# Patient Record
Sex: FEMALE | Race: BLACK OR AFRICAN AMERICAN | Employment: OTHER | URBAN - METROPOLITAN AREA
[De-identification: names, ages, dates, MRNs, and addresses within clinical notes are randomized per-mention and may not be internally consistent; named-entity substitution may affect disease eponyms.]

---

## 2017-01-03 ENCOUNTER — HOSPITAL ENCOUNTER (OUTPATIENT)
Dept: PHYSICAL THERAPY | Age: 57
Discharge: HOME | End: 2017-01-03
Payer: OTHER GOVERNMENT

## 2017-01-03 PROCEDURE — 97530 THERAPEUTIC ACTIVITIES: CPT

## 2017-01-03 PROCEDURE — 97110 THERAPEUTIC EXERCISES: CPT

## 2017-01-03 NOTE — PROGRESS NOTES
PT DAILY TREATMENT NOTE 12    Patient Name: Erick Jensen  Date:1/3/2017  : 1960  [x]  Patient  Verified  Payor:  / Plan: [facility]  RETIREES AND DEPENDENTS / Product Type: Surendra aLcy /    In time:440  Out time:531  Total Treatment Time (min): 51  Visit #: 2 of 12    Treatment Area: Low back pain [M54.5]    SUBJECTIVE  Pain Level (0-10 scale): 2  Any medication changes, allergies to medications, adverse drug reactions, diagnosis change, or new procedure performed?: [x] No    [] Yes (see summary sheet for update)  Subjective functional status/changes:   [] No changes reported  \"It is feeling pretty good. Better than last time. \"    OBJECTIVE    Modality rationale: decrease pain and increase tissue extensibility to improve the patients ability to perform ADL's with reduced pain levels.     Min Type Additional Details    [] Estim:  []Unatt       []IFC  []Premod                        []Other:  []w/ice   []w/heat  Position:  Location:    [] Estim: []Att    []TENS instruct  []NMES                    []Other:  []w/US   []w/ice   []w/heat  Position:  Location:    []  Traction: [] Cervical       []Lumbar                       [] Prone          []Supine                       []Intermittent   []Continuous Lbs:  [] before manual  [] after manual    []  Ultrasound: []Continuous   [] Pulsed                           []1MHz   []3MHz W/cm2:  Location:    []  Iontophoresis with dexamethasone         Location: [] Take home patch   [] In clinic   10 []  Ice     [x]  heat  []  Ice massage  []  Laser   []  Anodyne Position:  Location:    []  Laser with stim  []  Other:  Position:  Location:    []  Vasopneumatic Device Pressure:       [] lo [] med [] hi   Temperature: [] lo [] med [] hi   [x] Skin assessment post-treatment:  [x]intact []redness- no adverse reaction    []redness - adverse reaction:     31 min Therapeutic Exercise:  [x] See flow sheet :   Rationale: increase ROM, increase strength, improve coordination, improve balance and increase proprioception to improve the patients ability to perform ADL's with reduced pain levels. 10 min Neuromuscular Re-education:  [x]  See flow sheet :   Rationale: increase ROM, increase strength, improve coordination, improve balance and increase proprioception  to improve the patients ability to perform ADL's with reduced pain levels. With   [] TE   [] TA   [] neuro   [] other: Patient Education: [x] Review HEP    [] Progressed/Changed HEP based on:   [] positioning   [] body mechanics   [] transfers   [] heat/ice application    [] other:      Other Objective/Functional Measures: challenged with TA engagment     Pain Level (0-10 scale) post treatment: 1.5    ASSESSMENT/Changes in Function: Reports significant fatigue and soreness in gluts with exercises. Minor pain in neck with TA 2- advised pt to keep head and neck relaxed on pillow and abolished pain. Tolerated first session very well with reports of stiffness immediately following- reduced with heat. Patient will continue to benefit from skilled PT services to modify and progress therapeutic interventions, address functional mobility deficits, address ROM deficits, address strength deficits, analyze and address soft tissue restrictions, analyze and cue movement patterns, analyze and modify body mechanics/ergonomics, assess and modify postural abnormalities and instruct in home and community integration to attain remaining goals. []  See Plan of Care  []  See progress note/recertification  []  See Discharge Summary         Progress towards goals / Updated goals:  Short Term Goals: STG- To be accomplished in 2 week(s):  1. Pt will be independent with HEP to encourage prophylaxis. Eval:to be updated in 2-3 visits  Current: not dispensed      Long Term Goals: LTG- To be accomplished in 6 week(s):  1.  Pt will demonstrate ability to perform 10 bridges through full range with no pain or muscle spasm for increased glut strength needed to rise from sitting with no increase in pain at work. Eval:pain with bridge, 50% of range and pain  Current: NA      2. Pt will be able to report 50% reduction in right LE pain for improved work tolerance and quality of life. Eval:baseline  Current: NA      3. Pt hip ext strength will improve to 4+/5 and no pain to allow pt to ambulate in community unrestricted by pain. Eval:4- and pain  Current: NA      4. Pt FOTO score will increase by >5 points to show improvement in low back function.   Eval:57  Current: will address at visit 5    PLAN  []  Upgrade activities as tolerated     []  Continue plan of care  []  Update interventions per flow sheet       []  Discharge due to:_  []  Other:_      Callie Gil PT 1/3/2017  5:39 PM    Future Appointments  Date Time Provider Prema Lang   1/6/2017 3:30 PM Hosgabriella Oppenheim [facility]   1/10/2017 4:30 PM Callie Gil PT [facility]   1/13/2017 3:30 PM Hosie Oppenheim [facility]   1/16/2017 4:30 PM Hosie Oppenheim [facility]   1/19/2017 4:30 PM Hosie Oppenheim [facility]   1/24/2017 4:30 PM Callie Gil PT [facility]   1/26/2017 4:30 PM Hosie Oppenheim [facility]   1/31/2017 4:30 PM Callie Gil PT [facility]   2/2/2017 4:30 PM Hosie Oppenheim [facility]

## 2017-01-06 ENCOUNTER — HOSPITAL ENCOUNTER (OUTPATIENT)
Dept: PHYSICAL THERAPY | Age: 57
Discharge: HOME | End: 2017-01-06
Payer: OTHER GOVERNMENT

## 2017-01-06 PROCEDURE — 97530 THERAPEUTIC ACTIVITIES: CPT

## 2017-01-06 PROCEDURE — 97110 THERAPEUTIC EXERCISES: CPT

## 2017-01-06 NOTE — PROGRESS NOTES
PT DAILY TREATMENT NOTE     Patient Name: Parish Alva  Date:2017  : 1960  [x]  Patient  Verified  Payor: [facility] / Plan: [facility]  RETIREES AND DEPENDENTS / Product Type: Carmen Barrs /    In time:3:30  Out time:4:25  Total Treatment Time (min): 54  Visit #: 3 of 12    Treatment Area: Low back pain [M54.5]    SUBJECTIVE  Pain Level (0-10 scale): 1/10  Any medication changes, allergies to medications, adverse drug reactions, diagnosis change, or new procedure performed?: [x] No    [] Yes (see summary sheet for update)  Subjective functional status/changes:   [] No changes reported  \"I don't have very much pain today. Sometimes I do have pain and tingling down my Right leg. \"    OBJECTIVE    Modality rationale: decrease edema, decrease inflammation and decrease pain to improve the patients ability to perform ADLs with less pain.     Min Type Additional Details    [] Estim:  []Unatt       []IFC  []Premod                        []Other:  []w/ice   []w/heat  Position:  Location:    [] Estim: []Att    []TENS instruct  []NMES                    []Other:  []w/US   []w/ice   []w/heat  Position:  Location:    []  Traction: [] Cervical       []Lumbar                       [] Prone          []Supine                       []Intermittent   []Continuous Lbs:  [] before manual  [] after manual    []  Ultrasound: []Continuous   [] Pulsed                           []1MHz   []3MHz W/cm2:  Location:    []  Iontophoresis with dexamethasone         Location: [] Take home patch   [] In clinic   10 []  Ice     [x]  heat  []  Ice massage  []  Laser   []  Anodyne Position:supine  Location: low back    []  Laser with stim  []  Other:  Position:  Location:    []  Vasopneumatic Device Pressure:       [] lo [] med [] hi   Temperature: [] lo [] med [] hi   [x] Skin assessment post-treatment:  [x]intact []redness- no adverse reaction    []redness - adverse reaction:       15 min Therapeutic Exercise:  [x] See flow sheet : Rationale: increase ROM, increase strength and improve coordination to improve the patients ability to perform ADLs with less pain. 30 min Therapeutic Activity:  [x]  See flow sheet :   Rationale: increase ROM, increase strength and improve coordination  to improve the patients ability to perform ADLs with less pain. With   [] TE   [] TA   [] neuro   [] other: Patient Education: [x] Review HEP    [] Progressed/Changed HEP based on:   [] positioning   [] body mechanics   [] transfers   [] heat/ice application    [] other:      Other Objective/Functional Measures: HEP dispensed     Pain Level (0-10 scale) post treatment: 3/10    ASSESSMENT/Changes in Function: Pt tolerated ex well. Pt was moderately challenged by ex. Pt had mild increased pain post tx. Patient will continue to benefit from skilled PT services to modify and progress therapeutic interventions, address functional mobility deficits, address ROM deficits, address strength deficits, analyze and address soft tissue restrictions, analyze and cue movement patterns, analyze and modify body mechanics/ergonomics and assess and modify postural abnormalities to attain remaining goals. []  See Plan of Care  []  See progress note/recertification  []  See Discharge Summary         Progress towards goals / Updated goals:  Short Term Goals: STG- To be accomplished in 2 week(s):  1. Pt will be independent with HEP to encourage prophylaxis. Eval:to be updated in 2-3 visits  Current: dispensed on visit 3      Long Term Goals: LTG- To be accomplished in 6 week(s):  1. Pt will demonstrate ability to perform 10 bridges through full range with no pain or muscle spasm for increased glut strength needed to rise from sitting with no increase in pain at work. Eval:pain with bridge, 50% of range and pain  Current: NA      2. Pt will be able to report 50% reduction in right LE pain for improved work tolerance and quality of life.   Eval:baseline  Current: NA      3. Pt hip ext strength will improve to 4+/5 and no pain to allow pt to ambulate in community unrestricted by pain. Eval:4- and pain  Current: NA      4. Pt FOTO score will increase by >5 points to show improvement in low back function.   Eval:57  Current: will address at visit 5    PLAN  []  Upgrade activities as tolerated     [x]  Continue plan of care  []  Update interventions per flow sheet       []  Discharge due to:_  []  Other:_      Martín Burdicka 1/6/2017  4:49 PM    Future Appointments  Date Time Provider Prema Lang   1/10/2017 4:30 PM Lemon Oas, PT [facility]   1/13/2017 3:30 PM Lesta Chesterfield [facility]   1/16/2017 4:30 PM Lesta Chesterfield [facility]   1/19/2017 4:30 PM Lesta Chesterfield [facility]   1/24/2017 4:30 PM Lemon Oas, PT [facility]   1/26/2017 4:30 PM Lesta Chesterfield [facility]   1/31/2017 4:30 PM Lemon Oas, PT [facility]   2/2/2017 4:30 PM Lesta Chesterfield [facility]

## 2017-01-10 ENCOUNTER — APPOINTMENT (OUTPATIENT)
Dept: PHYSICAL THERAPY | Age: 57
End: 2017-01-10
Payer: OTHER GOVERNMENT

## 2017-01-13 ENCOUNTER — HOSPITAL ENCOUNTER (OUTPATIENT)
Dept: PHYSICAL THERAPY | Age: 57
Discharge: HOME | End: 2017-01-13
Payer: OTHER GOVERNMENT

## 2017-01-13 PROCEDURE — 97530 THERAPEUTIC ACTIVITIES: CPT

## 2017-01-13 PROCEDURE — 97110 THERAPEUTIC EXERCISES: CPT

## 2017-01-13 NOTE — PROGRESS NOTES
PT DAILY TREATMENT NOTE     Patient Name: Erick Jensen  Date:2017  : 1960  [x]  Patient  Verified  Payor:  / Plan: [facility]  RETIREES AND DEPENDENTS / Product Type:  /    In time:3:36  Out time:4:30  Total Treatment Time (min): 47  Visit #: 4 of 12    Treatment Area: Low back pain [M54.5]    SUBJECTIVE  Pain Level (0-10 scale): 1/10  Any medication changes, allergies to medications, adverse drug reactions, diagnosis change, or new procedure performed?: [x] No    [] Yes (see summary sheet for update)  Subjective functional status/changes:   [] No changes reported  \"I feel good. Just a little soreness mainly. I had pain after shoveling snow this weekend. \"    OBJECTIVE    Modality rationale: decrease edema, decrease inflammation and decrease pain to improve the patients ability to perform ADLs with less pain.     Min Type Additional Details    [] Estim:  []Unatt       []IFC  []Premod                        []Other:  []w/ice   []w/heat  Position:  Location:    [] Estim: []Att    []TENS instruct  []NMES                    []Other:  []w/US   []w/ice   []w/heat  Position:  Location:    []  Traction: [] Cervical       []Lumbar                       [] Prone          []Supine                       []Intermittent   []Continuous Lbs:  [] before manual  [] after manual    []  Ultrasound: []Continuous   [] Pulsed                           []1MHz   []3MHz W/cm2:  Location:    []  Iontophoresis with dexamethasone         Location: [] Take home patch   [] In clinic   10 []  Ice     [x]  heat  []  Ice massage  []  Laser   []  Anodyne Position: supine  Location: low back    []  Laser with stim  []  Other:  Position:  Location:    []  Vasopneumatic Device Pressure:       [] lo [] med [] hi   Temperature: [] lo [] med [] hi   [x] Skin assessment post-treatment:  [x]intact []redness- no adverse reaction    []redness - adverse reaction:     14 min Therapeutic Exercise:  [x] See flow sheet : Rationale: increase ROM, increase strength and improve coordination to improve the patients ability to perform ADLs with less pain. 30 min Therapeutic Activity:  [x]  See flow sheet :   Rationale: increase ROM, increase strength and improve coordination  to improve the patients ability to perform ADLs with less pain. With   [] TE   [] TA   [] neuro   [] other: Patient Education: [x] Review HEP    [] Progressed/Changed HEP based on:   [] positioning   [] body mechanics   [] transfers   [] heat/ice application    [] other:      Other Objective/Functional Measures:      Pain Level (0-10 scale) post treatment: 2/10    ASSESSMENT/Changes in Function: Pt tolerated ex well. Pt had increased discomfort but no pain. Pt reported fatigue post tx. Patient will continue to benefit from skilled PT services to modify and progress therapeutic interventions, address functional mobility deficits, address ROM deficits, address strength deficits, analyze and address soft tissue restrictions and analyze and cue movement patterns to attain remaining goals. []  See Plan of Care  []  See progress note/recertification  []  See Discharge Summary         Progress towards goals / Updated goals:  Short Term Goals: STG- To be accomplished in 2 week(s):  1. Pt will be independent with HEP to encourage prophylaxis. Eval:to be updated in 2-3 visits  Current: dispensed on visit 3      Long Term Goals: LTG- To be accomplished in 6 week(s):  1. Pt will demonstrate ability to perform 10 bridges through full range with no pain or muscle spasm for increased glut strength needed to rise from sitting with no increase in pain at work. Eval:pain with bridge, 50% of range and pain  Current: NA      2. Pt will be able to report 50% reduction in right LE pain for improved work tolerance and quality of life. Eval:baseline  Current: Pt reports sporadic pain down aleena LE.      3.  Pt hip ext strength will improve to 4+/5 and no pain to allow pt to ambulate in community unrestricted by pain. Eval:4- and pain  Current: NA      4. Pt FOTO score will increase by >5 points to show improvement in low back function.   Eval:57  Current: will address at visit 5       PLAN  []  Upgrade activities as tolerated     [x]  Continue plan of care  []  Update interventions per flow sheet       []  Discharge due to:_  []  Other:_      Hubert Lowery 1/13/2017  3:52 PM    Future Appointments  Date Time Provider Prema Lang   1/16/2017 4:30 PM Barthbatsheva Sebastián [facility]   1/19/2017 4:30 PM Barthbatsheva Sebastián [facility]   1/24/2017 4:30 PM EVA [facility]   1/26/2017 4:30 PM Hubert Sebastián [facility]   1/31/2017 4:30 PM Lakisha Barnhart PT [facility]   2/2/2017 4:30 PM Barthbatsheva Sebastián [facility]

## 2017-01-16 ENCOUNTER — HOSPITAL ENCOUNTER (OUTPATIENT)
Dept: PHYSICAL THERAPY | Age: 57
Discharge: HOME | End: 2017-01-16
Payer: OTHER GOVERNMENT

## 2017-01-16 PROCEDURE — 97530 THERAPEUTIC ACTIVITIES: CPT

## 2017-01-16 PROCEDURE — 97110 THERAPEUTIC EXERCISES: CPT

## 2017-01-16 NOTE — PROGRESS NOTES
PT DAILY TREATMENT NOTE     Patient Name: Raeann Anne  Date:2017  : 1960  [x]  Patient  Verified  Payor:  / Plan: [facility] [address] [facility]  RETIREES AND DEPENDENTS / Product Type: Rosalva Brown /    In time:4:32  Out time:5:25  Total Treatment Time (min): 53  Visit #: 5 of 12    Treatment Area: Low back pain [M54.5]    SUBJECTIVE  Pain Level (0-10 scale): 310  Any medication changes, allergies to medications, adverse drug reactions, diagnosis change, or new procedure performed?: [x] No    [] Yes (see summary sheet for update)  Subjective functional status/changes:   [] No changes reported  \"I have a little pain today but yesterday morning I woke up with a ton of pain because I was walking around lots for houses Saturday. \"    OBJECTIVE    Modality rationale: decrease edema, decrease inflammation and decrease pain to improve the patients ability to perform ADLs with less pain.     Min Type Additional Details    [] Estim:  []Unatt       []IFC  []Premod                        []Other:  []w/ice   []w/heat  Position:  Location:    [] Estim: []Att    []TENS instruct  []NMES                    []Other:  []w/US   []w/ice   []w/heat  Position:  Location:    []  Traction: [] Cervical       []Lumbar                       [] Prone          []Supine                       []Intermittent   []Continuous Lbs:  [] before manual  [] after manual    []  Ultrasound: []Continuous   [] Pulsed                           []1MHz   []3MHz W/cm2:  Location:    []  Iontophoresis with dexamethasone         Location: [] Take home patch   [] In clinic   10 []  Ice     [x]  heat  []  Ice massage  []  Laser   []  Anodyne Position: supine  Location: low back    []  Laser with stim  []  Other:  Position:  Location:    []  Vasopneumatic Device Pressure:       [] lo [] med [] hi   Temperature: [] lo [] med [] hi   [x] Skin assessment post-treatment:  [x]intact []redness- no adverse reaction    []redness - adverse reaction:       30 min Therapeutic Exercise:  [x] See flow sheet :   Rationale: increase ROM, increase strength and improve coordination to improve the patients ability to perform ADLs with less pain. 13 min Therapeutic Activity:  [x]  See flow sheet :   Rationale: increase ROM, increase strength and improve coordination  to improve the patients ability to perform ADLs with less pain. With   [] TE   [] TA   [] neuro   [] other: Patient Education: [x] Review HEP    [] Progressed/Changed HEP based on:   [] positioning   [] body mechanics   [] transfers   [] heat/ice application    [] other:      Other Objective/Functional Measures: FOTO: 61    Pain Level (0-10 scale) post treatment: 3/10    ASSESSMENT/Changes in Function: Pt tolerated ex well. Pt had no change in pain level post tx. Patient will continue to benefit from skilled PT services to modify and progress therapeutic interventions, address functional mobility deficits, address ROM deficits, address strength deficits, analyze and address soft tissue restrictions, analyze and cue movement patterns and analyze and modify body mechanics/ergonomics to attain remaining goals. []  See Plan of Care  []  See progress note/recertification  []  See Discharge Summary         Progress towards goals / Updated goals:  Short Term Goals: STG- To be accomplished in 2 week(s):  1. Pt will be independent with HEP to encourage prophylaxis. Eval:to be updated in 2-3 visits  Current: dispensed on visit 3      Long Term Goals: LTG- To be accomplished in 6 week(s):  1. Pt will demonstrate ability to perform 10 bridges through full range with no pain or muscle spasm for increased glut strength needed to rise from sitting with no increase in pain at work. Eval:pain with bridge, 50% of range and pain  Current: NA      2. Pt will be able to report 50% reduction in right LE pain for improved work tolerance and quality of life.   Eval:baseline  Current: Pt reports sporadic pain down aleena LE.      3. Pt hip ext strength will improve to 4+/5 and no pain to allow pt to ambulate in community unrestricted by pain. Eval:4- and pain  Current: NA      4. Pt FOTO score will increase by >5 points to show improvement in low back function.   Eval:57  Current: 64       PLAN  []  Upgrade activities as tolerated     [x]  Continue plan of care  []  Update interventions per flow sheet       []  Discharge due to:_  []  Other:_      Shirlean Babinski 1/16/2017  5:10 PM    Future Appointments  Date Time Provider Prema Lang   1/19/2017 4:30 PM Shirlean Babinski [facility]   1/24/2017 4:30 PM EVA Tijerina [facility]   1/26/2017 4:30 PM [facility]   1/31/2017 4:30 PM EVA Tijerina [facility]   2/2/2017 4:30 PM Shirlean Babinski [facility]

## 2017-01-19 ENCOUNTER — HOSPITAL ENCOUNTER (OUTPATIENT)
Dept: PHYSICAL THERAPY | Age: 57
Discharge: HOME | End: 2017-01-19
Payer: OTHER GOVERNMENT

## 2017-01-19 PROCEDURE — 97530 THERAPEUTIC ACTIVITIES: CPT

## 2017-01-19 PROCEDURE — 97110 THERAPEUTIC EXERCISES: CPT

## 2017-01-19 NOTE — PROGRESS NOTES
PT DAILY TREATMENT NOTE     Patient Name: Jose Luis Rivera  Date:2017  : 1960  [x]  Patient  Verified  Payor:  / Plan: [facility]  RETIREES AND DEPENDENTS / Product Type:  /    In time:4:34  Out time:5:31  Total Treatment Time (min): 62  Visit #: 6 of 12    Treatment Area: Low back pain [M54.5]    SUBJECTIVE  Pain Level (0-10 scale): 0/10  Any medication changes, allergies to medications, adverse drug reactions, diagnosis change, or new procedure performed?: [x] No    [] Yes (see summary sheet for update)  Subjective functional status/changes:   [] No changes reported  \"I do not have any pain today! \"    OBJECTIVE    Modality rationale: decrease edema, decrease inflammation and decrease pain to improve the patients ability to perform ADLs with less pain.     Min Type Additional Details    [] Estim:  []Unatt       []IFC  []Premod                        []Other:  []w/ice   []w/heat  Position:  Location:    [] Estim: []Att    []TENS instruct  []NMES                    []Other:  []w/US   []w/ice   []w/heat  Position:  Location:    []  Traction: [] Cervical       []Lumbar                       [] Prone          []Supine                       []Intermittent   []Continuous Lbs:  [] before manual  [] after manual    []  Ultrasound: []Continuous   [] Pulsed                           []1MHz   []3MHz W/cm2:  Location:    []  Iontophoresis with dexamethasone         Location: [] Take home patch   [] In clinic   10 []  Ice     [x]  heat  []  Ice massage  []  Laser   []  Anodyne Position: supine  Location: low back    []  Laser with stim  []  Other:  Position:  Location:    []  Vasopneumatic Device Pressure:       [] lo [] med [] hi   Temperature: [] lo [] med [] hi   [x] Skin assessment post-treatment:  [x]intact []redness- no adverse reaction    []redness - adverse reaction:       9 min Therapeutic Exercise:  [x] See flow sheet :   Rationale: increase ROM, increase strength and improve coordination to improve the patients ability to perform ADLs with less pain. 38 min Therapeutic Activity:  [x]  See flow sheet :   Rationale: increase ROM, increase strength and improve coordination  to improve the patients ability to perform ADLs with less pain. With   [] TE   [] TA   [] neuro   [] other: Patient Education: [x] Review HEP    [] Progressed/Changed HEP based on:   [] positioning   [] body mechanics   [] transfers   [] heat/ice application    [] other:      Other Objective/Functional Measures:      Pain Level (0-10 scale) post treatment: 0/10    ASSESSMENT/Changes in Function: Pt tolerated ex well with no increases in pain but pt did have increased fatigue with ex. Pt received MHP post tx. Patient will continue to benefit from skilled PT services to modify and progress therapeutic interventions, address functional mobility deficits, address ROM deficits, address strength deficits and analyze and cue movement patterns to attain remaining goals. []  See Plan of Care  []  See progress note/recertification  []  See Discharge Summary         Progress towards goals / Updated goals:  Short Term Goals: STG- To be accomplished in 2 week(s):  1. Pt will be independent with HEP to encourage prophylaxis. Eval:to be updated in 2-3 visits  Current: dispensed on visit 3      Long Term Goals: LTG- To be accomplished in 6 week(s):  1. Pt will demonstrate ability to perform 10 bridges through full range with no pain or muscle spasm for increased glut strength needed to rise from sitting with no increase in pain at work. Eval:pain with bridge, 50% of range and pain  Current: NA      2. Pt will be able to report 50% reduction in right LE pain for improved work tolerance and quality of life. Eval:baseline  Current: Pt reports sporadic pain down aleena LE.      3. Pt hip ext strength will improve to 4+/5 and no pain to allow pt to ambulate in community unrestricted by pain.   Eval:4- and pain  Current: NA      4. Pt FOTO score will increase by >5 points to show improvement in low back function.   Eval:57  Current: 64       PLAN  []  Upgrade activities as tolerated     [x]  Continue plan of care  []  Update interventions per flow sheet       []  Discharge due to:_  []  Other:_      Jeanine Mix 1/19/2017  4:49 PM    Future Appointments  Date Time Provider Prema Lang   1/24/2017 4:30 PM [facility]arl[facility]   1/26/2017 4:30 PM Jeanine COFFEY [facility]   1/31/2017 4:30 PM [facility]arl[facility]   2/2/2017 4:30 PM Jeanine COFFEY [facility]

## 2017-01-24 ENCOUNTER — HOSPITAL ENCOUNTER (OUTPATIENT)
Dept: PHYSICAL THERAPY | Age: 57
Discharge: HOME | End: 2017-01-24
Payer: OTHER GOVERNMENT

## 2017-01-24 PROCEDURE — 97110 THERAPEUTIC EXERCISES: CPT

## 2017-01-24 PROCEDURE — 97530 THERAPEUTIC ACTIVITIES: CPT

## 2017-01-24 NOTE — PROGRESS NOTES
PT DAILY TREATMENT NOTE     Patient Name: Geovany Wallace  Date:2017  : 1960  [x]  Patient  Verified  Payor:  / Plan: [facility]  RETIREES AND DEPENDENTS / Product Type: [employer] /    In time:430  Out time:528  Total Treatment Time (min): 58  Visit #: 7 of 12    Treatment Area: Low back pain [M54.5]    SUBJECTIVE  Pain Level (0-10 scale): 0  Any medication changes, allergies to medications, adverse drug reactions, diagnosis change, or new procedure performed?: [x] No    [] Yes (see summary sheet for update)  Subjective functional status/changes:   [] No changes reported  \"it isn't hurting right now. I still feel like my lower back is  from my upper back. \"    OBJECTIVE    Modality rationale: decrease pain and increase tissue extensibility to improve the patients ability to perform ADL's with reduced pain levels.     Min Type Additional Details    [] Estim:  []Unatt       []IFC  []Premod                        []Other:  []w/ice   []w/heat  Position:  Location:    [] Estim: []Att    []TENS instruct  []NMES                    []Other:  []w/US   []w/ice   []w/heat  Position:  Location:    []  Traction: [] Cervical       []Lumbar                       [] Prone          []Supine                       []Intermittent   []Continuous Lbs:  [] before manual  [] after manual    []  Ultrasound: []Continuous   [] Pulsed                           []1MHz   []3MHz W/cm2:  Location:    []  Iontophoresis with dexamethasone         Location: [] Take home patch   [] In clinic   10 []  Ice     [x]  heat  []  Ice massage  []  Laser   []  Anodyne Position: supine  Location:l-spine    []  Laser with stim  []  Other:  Position:  Location:    []  Vasopneumatic Device Pressure:       [] lo [] med [] hi   Temperature: [] lo [] med [] hi   [x] Skin assessment post-treatment:  [x]intact []redness- no adverse reaction    []redness - adverse reaction:     36 min Therapeutic Exercise:  [x] See flow sheet : Rationale: increase ROM, increase strength, improve coordination, improve balance and increase proprioception to improve the patients ability to perform ADL's with reduced pain levels. 12 min Therapeutic Activity:  [x]  See flow sheet :   Rationale: increase ROM, increase strength and improve coordination  to improve the patients ability to perform ADL's with reduced pain levels. With   [] TE   [] TA   [] neuro   [] other: Patient Education: [x] Review HEP    [] Progressed/Changed HEP based on:   [] positioning   [] body mechanics   [] transfers   [] heat/ice application    [] other:      Other Objective/Functional Measures: see goal updates below     Pain Level (0-10 scale) post treatment: 0    ASSESSMENT/Changes in Function: Pt continues to be appropriately challenged with exercises. Noting improvements in strength and objective measures. Reduced LE symptoms from constant to intermittent. Patient will continue to benefit from skilled PT services to modify and progress therapeutic interventions, address functional mobility deficits, address ROM deficits, address strength deficits, analyze and address soft tissue restrictions, analyze and cue movement patterns, analyze and modify body mechanics/ergonomics, assess and modify postural abnormalities and instruct in home and community integration to attain remaining goals. []  See Plan of Care  [x]  See progress note/recertification  []  See Discharge Summary         Progress towards goals / Updated goals:  Short Term Goals: STG- To be accomplished in 2 week(s):  1. Pt will be independent with HEP to encourage prophylaxis. Eval:dispensed at 2-3 visits  Current: compliance- MET      Long Term Goals: LTG- To be accomplished in 6 week(s):  1. Pt will demonstrate ability to perform 10 bridges through full range with no pain or muscle spasm for increased glut strength needed to rise from sitting with no increase in pain at work.   Eval:pain with bridge, 50% of range and pain  Current: no pain with 10 sets, improved rising from sitting      2. Pt will be able to report 50% reduction in right LE pain for improved work tolerance and quality of life. Eval:baseline  Current: improved in frequency- was constant and now intermittent, 40% improvements improvement      3. Pt hip ext strength will improve to 4+/5 and no pain to allow pt to ambulate in community unrestricted by pain. Eval:4- and pain  Current: 4/5 and pain       4. Pt FOTO score will increase by >5 points to show improvement in low back function.   Eval:57  Current: 61,4 point progress    PLAN  [x]  Upgrade activities as tolerated     []  Continue plan of care  []  Update interventions per flow sheet       []  Discharge due to:_  []  Other:_      Juliana De Jesus PT 1/24/2017  4:56 PM    Future Appointments  Date Time Provider Prema Lang   1/26/2017 4:30 PM Gerry COFFEY [facility]   1/31/2017 4:30 PM EVA Thao [facility]   2/2/2017 4:30 PM Gerry COFFEY [facility]

## 2017-01-25 NOTE — PROGRESS NOTES
[facility] at the [address], [place], [address] [facility]  Phone: [phone]      Fax:  [phone]    Progress Note  Patient name: Escobar Watson Start of Care: 16   Referral source: Briseida Parker : 1960   Medical/Treatment Diagnosis: Low back pain [M54.5] Onset Date:2016     Prior Hospitalization: see medical history Provider#: [id number]   Medications: Verified on Patient Summary List    Comorbidities: fibromyalgia, arthritis, asthma, BMI  Prior Level of Function: pain for >20 years with all activities, worsening in the last year    Visits from Start of Care: 7    Missed Visits: 0      Established Goals:          Short Term Goals: STG- To be accomplished in 2 week(s):  1. Pt will be independent with HEP to encourage prophylaxis. Eval:dispensed at 2-3 visits  Current: compliance- MET      Long Term Goals: LTG- To be accomplished in 6 week(s):  1. Pt will demonstrate ability to perform 10 bridges through full range with no pain or muscle spasm for increased glut strength needed to rise from sitting with no increase in pain at work. Eval:pain with bridge, 50% of range and pain  Current: no pain with 10 sets, improved rising from sitting      2. Pt will be able to report 50% reduction in right LE pain for improved work tolerance and quality of life. Eval:baseline  Current: improved in frequency- was constant and now intermittent, 40% improvements improvement      3. Pt hip ext strength will improve to 4+/5 and no pain to allow pt to ambulate in community unrestricted by pain. Eval:4- and pain  Current: 4/5 and pain       4. Pt FOTO score will increase by >5 points to show improvement in low back function. Eval:57  Current: 61,4 point progress       Key Functional Changes: Pt has attended 7 PT visits to improve back pain. Continues to significantly over recruit lumbar paraspinals, with delayed glut activation and facilitation.   Reports feelings of \"lower spine  from supper spine\" however have not noted any instability, only weakness in core, gluts and anterior pelvic positioning. Notes improvements in LE radicular symptoms, no longer constant. Pt reports feeling possible plateau in care, may follow up with MD.  PT continues to recommend PT due to impairments remaining per goal measure. Updated Goals: to be achieved in 4 weeks:   Cont with unmet goals  ASSESSMENT/RECOMMENDATIONS:  [x]Continue therapy per initial plan/protocol at a frequency of  2 x per week for 4 weeks  []Continue therapy with the following recommended changes:_____________________      _____________________________________________________________________  []Discontinue therapy progressing towards or have reached established goals  []Discontinue therapy due to lack of appreciable progress towards goals  []Discontinue therapy due to lack of attendance or compliance  []Await Physician's recommendations/decisions regarding therapy  []Other:________________________________________________________________    Thank you for this referral.   Felecia Cummins, PT 1/24/2017 7:09 PM  NOTE TO PHYSICIAN:  PLEASE COMPLETE THE ORDERS BELOW AND   FAX TO [facility] [facility]: [phone]  If you are unable to process this request in 24 hours please contact our office: [phone]        []  I have read the above report and request that my patient continue as recommended. []  I have read the above report and request that my patient continue therapy with the following changes/special instructions:________________________________________  []I have read the above report and request that my patient be discharged from therapy.     Physicians signature: ______________________________Date: ______Time:______

## 2017-01-26 ENCOUNTER — APPOINTMENT (OUTPATIENT)
Dept: PHYSICAL THERAPY | Age: 57
End: 2017-01-26
Payer: OTHER GOVERNMENT

## 2017-01-31 ENCOUNTER — HOSPITAL ENCOUNTER (OUTPATIENT)
Dept: PHYSICAL THERAPY | Age: 57
Discharge: HOME | End: 2017-01-31
Payer: OTHER GOVERNMENT

## 2017-01-31 PROCEDURE — 97530 THERAPEUTIC ACTIVITIES: CPT

## 2017-01-31 PROCEDURE — 97110 THERAPEUTIC EXERCISES: CPT

## 2017-01-31 NOTE — PROGRESS NOTES
PT DAILY TREATMENT NOTE     Patient Name: Geovany Wallace  Date:2017  : 1960  [x]  Patient  Verified  Payor:  / Plan: [facility]  RETIREES AND DEPENDENTS / Product Type:  /    In time:430  Out time:518  Total Treatment Time (min): 48  Visit #: 8 of 16    Treatment Area: Low back pain [M54.5]    SUBJECTIVE  Pain Level (0-10 scale): 3  Any medication changes, allergies to medications, adverse drug reactions, diagnosis change, or new procedure performed?: [x] No    [] Yes (see summary sheet for update)  Subjective functional status/changes:   [] No changes reported  \"It is still bad. \"    OBJECTIVE    Modality rationale: decrease pain and increase tissue extensibility to improve the patients ability to perform ADL's with reduced pain levels.     Min Type Additional Details    [] Estim:  []Unatt       []IFC  []Premod                        []Other:  []w/ice   []w/heat  Position:  Location:    [] Estim: []Att    []TENS instruct  []NMES                    []Other:  []w/US   []w/ice   []w/heat  Position:  Location:    []  Traction: [] Cervical       []Lumbar                       [] Prone          []Supine                       []Intermittent   []Continuous Lbs:  [] before manual  [] after manual    []  Ultrasound: []Continuous   [] Pulsed                           []1MHz   []3MHz W/cm2:  Location:    []  Iontophoresis with dexamethasone         Location: [] Take home patch   [] In clinic   10 []  Ice     [x]  heat  []  Ice massage  []  Laser   []  Anodyne Position: supine  Location:l-spine    []  Laser with stim  []  Other:  Position:  Location:    []  Vasopneumatic Device Pressure:       [] lo [] med [] hi   Temperature: [] lo [] med [] hi   [x] Skin assessment post-treatment:  [x]intact []redness- no adverse reaction    []redness - adverse reaction:     23 min Therapeutic Exercise:  [x] See flow sheet :   Rationale: increase ROM, increase strength and improve coordination to improve the patients ability to perform ADL's with reduced pain levels. 15 min Therapeutic Activity:  [x]  See flow sheet :   Rationale: increase ROM, increase strength, improve coordination, improve balance and increase proprioception  to improve the patients ability to perform ADL's with reduced pain levels. With   [] TE   [] TA   [] neuro   [] other: Patient Education: [x] Review HEP    [] Progressed/Changed HEP based on:   [] positioning   [] body mechanics   [] transfers   [] heat/ice application    [] other:      Other Objective/Functional Measures:      Pain Level (0-10 scale) post treatment: 0    ASSESSMENT/Changes in Function: Significant fatigue with reverse clams. No pain with exercises only fatigue noted. Updated HEP and verbalized understanding. Due to remaining pain in low back to be placed on 30 day hold to follow up with MD.       Patient will continue to benefit from skilled PT services to modify and progress therapeutic interventions, address functional mobility deficits, address ROM deficits, address strength deficits, analyze and address soft tissue restrictions, analyze and cue movement patterns, analyze and modify body mechanics/ergonomics, assess and modify postural abnormalities and instruct in home and community integration to attain remaining goals. []  See Plan of Care  []  See progress note/recertification  []  See Discharge Summary         Progress towards goals / Updated goals:  Short Term Goals: STG- To be accomplished in 2 week(s):  1. Pt will be independent with HEP to encourage prophylaxis. Eval:dispensed at 2-3 visits  Current: compliance- MET      Long Term Goals: LTG- To be accomplished in 6 week(s):  1. Pt will demonstrate ability to perform 10 bridges through full range with no pain or muscle spasm for increased glut strength needed to rise from sitting with no increase in pain at work.   Eval:pain with bridge, 50% of range and pain  Current: no pain with 10 sets, improved rising from sitting      2. Pt will be able to report 50% reduction in right LE pain for improved work tolerance and quality of life. Eval:baseline  Current: improved in frequency- was constant and now intermittent, 40% improvements improvement      3. Pt hip ext strength will improve to 4+/5 and no pain to allow pt to ambulate in community unrestricted by pain. Eval:4- and pain  Current: 4/5 and pain       4. Pt FOTO score will increase by >5 points to show improvement in low back function.   Eval:57  Current: 61,4 point progress    PLAN  [x]  Upgrade activities as tolerated     []  Continue plan of care  []  Update interventions per flow sheet       []  Discharge due to:_  []  Other:_      Zen James, PT 1/31/2017  4:37 PM    Future Appointments  Date Time Provider Prema Lang   2/2/2017 4:30 PM Uzma Flowers [id number]

## 2017-02-02 ENCOUNTER — APPOINTMENT (OUTPATIENT)
Dept: PHYSICAL THERAPY | Age: 57
End: 2017-02-02

## 2017-03-08 NOTE — PROGRESS NOTES
[facility] at the [address], [place], [address] [facility]  Phone: [phone]      Fax:  [phone]    Discharge Summary    Patient name: Raeann Anne     Start of Care: 16  Referral source: Briseida Cuellar    : 1960  Medical/Treatment Diagnosis: Low back pain [M54.5]  Onset Date:2016  Prior Hospitalization: see medical history   Provider#: [id number]  Comorbidities: fibromyalgia, arthritis, asthma, BMI  Prior Level of Function: pain for >20 years with all activities, worsening in the last year  Medications: Verified on Patient Summary List    Visits from Start of Care: 8    Missed Visits: 0  Reporting Period : 16 to 17    Short Term Goals: STG- To be accomplished in 2 week(s):  1. Pt will be independent with HEP to encourage prophylaxis. Eval:dispensed at 2-3 visits  Current: compliance- MET      Long Term Goals: LTG- To be accomplished in 6 week(s):  1. Pt will demonstrate ability to perform 10 bridges through full range with no pain or muscle spasm for increased glut strength needed to rise from sitting with no increase in pain at work. Eval:pain with bridge, 50% of range and pain  Current: no pain with 10 sets, improved rising from sitting      2. Pt will be able to report 50% reduction in right LE pain for improved work tolerance and quality of life. Eval:baseline  Current: improved in frequency- was constant and now intermittent, 40% improvements improvement      3. Pt hip ext strength will improve to 4+/5 and no pain to allow pt to ambulate in community unrestricted by pain. Eval:4- and pain  Current: 4/5 and pain       4. Pt FOTO score will increase by >5 points to show improvement in low back function. Eval:57  Current: 61,4 point progress         Assessment/ Summary of Care: Physical therapy consisted of exercises to target low back pain and improve function.   Treatment strategies included therapeutic exercises, therapeutic activity, neuromuscular re-education, patient education, and modalities to improve tolerance to work duties, household chores, , and ADLs. Pt performed well in therapy, with significant improvement in pain with everyday activities and progression of strength. Due to progress and desire to d/ HEP, pt to be discharged at this time.      RECOMMENDATIONS:  [x]Discontinue therapy: [x]Patient has reached or is progressing toward set goals      []Patient is non-compliant or has abdicated      []Due to lack of appreciable progress towards set goals    Nida Barragan, PT 3/8/2017 10:44 AM

## 2018-08-27 ENCOUNTER — APPOINTMENT (OUTPATIENT)
Dept: PHYSICAL THERAPY | Age: 58
End: 2018-08-27